# Patient Record
Sex: MALE | HISPANIC OR LATINO | URBAN - METROPOLITAN AREA
[De-identification: names, ages, dates, MRNs, and addresses within clinical notes are randomized per-mention and may not be internally consistent; named-entity substitution may affect disease eponyms.]

---

## 2017-03-13 ENCOUNTER — OFFICE VISIT - HEALTHEAST (OUTPATIENT)
Dept: FAMILY MEDICINE | Facility: CLINIC | Age: 22
End: 2017-03-13

## 2017-03-13 DIAGNOSIS — R51.9 HEADACHE, UNSPECIFIED HEADACHE TYPE: ICD-10-CM

## 2017-03-13 ASSESSMENT — MIFFLIN-ST. JEOR: SCORE: 1655.99

## 2017-04-14 ENCOUNTER — COMMUNICATION - HEALTHEAST (OUTPATIENT)
Dept: FAMILY MEDICINE | Facility: CLINIC | Age: 22
End: 2017-04-14

## 2017-04-21 RX ORDER — CLOBETASOL PROPIONATE 0.5 MG/G
OINTMENT TOPICAL
Qty: 30 G | Refills: 0 | Status: SHIPPED | OUTPATIENT
Start: 2017-04-21 | End: 2024-08-13

## 2017-08-10 ENCOUNTER — OFFICE VISIT - HEALTHEAST (OUTPATIENT)
Dept: FAMILY MEDICINE | Facility: CLINIC | Age: 22
End: 2017-08-10

## 2017-08-10 DIAGNOSIS — Z20.2 EXPOSURE TO STD: ICD-10-CM

## 2017-08-10 LAB — HIV 1+2 AB+HIV1 P24 AG SERPL QL IA: NEGATIVE

## 2017-08-11 LAB — HEPATITIS B SURFACE ANTIBODY LHE- HISTORICAL: POSITIVE

## 2021-05-30 VITALS — BODY MASS INDEX: 27.7 KG/M2 | HEIGHT: 65 IN | WEIGHT: 166.25 LBS

## 2021-05-31 VITALS — WEIGHT: 169.5 LBS | BODY MASS INDEX: 28.21 KG/M2
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
## 2024-07-20 ENCOUNTER — HEALTH MAINTENANCE LETTER (OUTPATIENT)
Age: 29
End: 2024-07-20

## 2024-08-13 ENCOUNTER — OFFICE VISIT (OUTPATIENT)
Dept: INTERNAL MEDICINE | Facility: CLINIC | Age: 29
End: 2024-08-13
Payer: COMMERCIAL

## 2024-08-13 VITALS
DIASTOLIC BLOOD PRESSURE: 71 MMHG | RESPIRATION RATE: 19 BRPM | WEIGHT: 180.3 LBS | HEIGHT: 65 IN | BODY MASS INDEX: 30.04 KG/M2 | OXYGEN SATURATION: 100 % | HEART RATE: 68 BPM | SYSTOLIC BLOOD PRESSURE: 120 MMHG | TEMPERATURE: 97.2 F

## 2024-08-13 DIAGNOSIS — L20.9 ATOPIC DERMATITIS, UNSPECIFIED TYPE: Primary | ICD-10-CM

## 2024-08-13 PROCEDURE — 99204 OFFICE O/P NEW MOD 45 MIN: CPT

## 2024-08-13 RX ORDER — CLOBETASOL PROPIONATE 0.5 MG/G
OINTMENT TOPICAL
Qty: 30 G | Refills: 3 | Status: SHIPPED | OUTPATIENT
Start: 2024-08-13

## 2024-08-13 ASSESSMENT — PAIN SCALES - GENERAL: PAINLEVEL: NO PAIN (0)

## 2024-08-13 NOTE — PROGRESS NOTES
"  Assessment & Plan     (L20.9) Atopic dermatitis, unspecified type  (primary encounter diagnosis)  Comment: recent outbreak of atopic dermatitis. sent refill for clobetasol.  We talked about common triggers such as extreme hot or cold temperatures, dry skin, harsh detergents.  We talked about the importance of making sure his skin is well moisturized  Plan: clobetasol (TEMOVATE) 0.05 % external ointment                  BMI  Estimated body mass index is 30 kg/m  as calculated from the following:    Height as of this encounter: 1.651 m (5' 5\").    Weight as of this encounter: 81.8 kg (180 lb 4.8 oz).   Weight management plan: Discussed healthy diet and exercise guidelines          Nancy Holliday is a 29 year old, presenting for the following health issues:  Rash (And dryness)      8/13/2024     4:25 PM   Additional Questions   Roomed by Betty Cooper MA   Accompanied by self         8/13/2024     4:25 PM   Patient Reported Additional Medications   Patient reports taking the following new medications None     History of Present Illness       Reason for visit:  Skin rash    He eats 2-3 servings of fruits and vegetables daily.He consumes 1 sweetened beverage(s) daily.He exercises with enough effort to increase his heart rate 30 to 60 minutes per day.  He exercises with enough effort to increase his heart rate 4 days per week.   He is taking medications regularly.            Objective    /71 (BP Location: Left arm, Patient Position: Sitting, Cuff Size: Adult Regular)   Pulse 68   Temp 97.2  F (36.2  C) (Oral)   Resp 19   Ht 1.651 m (5' 5\")   Wt 81.8 kg (180 lb 4.8 oz)   SpO2 100%   BMI 30.00 kg/m    Body mass index is 30 kg/m .          Signed Electronically by: KODI Lozano CNP    "

## 2025-02-03 ENCOUNTER — LAB REQUISITION (OUTPATIENT)
Dept: LAB | Facility: CLINIC | Age: 30
End: 2025-02-03
Payer: COMMERCIAL

## 2025-02-03 DIAGNOSIS — H16.012 CENTRAL CORNEAL ULCER, LEFT EYE: ICD-10-CM

## 2025-02-03 LAB
BACTERIA SPEC CULT: NORMAL
GRAM STAIN RESULT: NORMAL
GRAM STAIN RESULT: NORMAL

## 2025-02-03 PROCEDURE — 87102 FUNGUS ISOLATION CULTURE: CPT | Mod: ORL

## 2025-02-03 PROCEDURE — 87076 CULTURE ANAEROBE IDENT EACH: CPT | Mod: ORL

## 2025-02-03 PROCEDURE — 87102 FUNGUS ISOLATION CULTURE: CPT | Mod: ORL | Performed by: OPTOMETRIST

## 2025-02-03 PROCEDURE — 87070 CULTURE OTHR SPECIMN AEROBIC: CPT | Mod: ORL | Performed by: OPTOMETRIST

## 2025-02-03 PROCEDURE — 87205 SMEAR GRAM STAIN: CPT | Mod: ORL | Performed by: OPTOMETRIST

## 2025-02-03 PROCEDURE — 87205 SMEAR GRAM STAIN: CPT | Mod: ORL

## 2025-02-06 LAB
BACTERIA SPEC CULT: NO GROWTH
BACTERIA SPEC CULT: NORMAL

## 2025-02-09 LAB — BACTERIA SPEC CULT: ABNORMAL

## 2025-02-13 LAB — BACTERIA SPEC CULT: NORMAL

## 2025-02-20 LAB — BACTERIA SPEC CULT: NORMAL

## 2025-02-27 LAB — BACTERIA SPEC CULT: NORMAL

## 2025-03-03 LAB — BACTERIA SPEC CULT: NO GROWTH

## 2025-08-09 ENCOUNTER — HEALTH MAINTENANCE LETTER (OUTPATIENT)
Age: 30
End: 2025-08-09